# Patient Record
Sex: FEMALE | Race: BLACK OR AFRICAN AMERICAN | Employment: PART TIME | URBAN - METROPOLITAN AREA
[De-identification: names, ages, dates, MRNs, and addresses within clinical notes are randomized per-mention and may not be internally consistent; named-entity substitution may affect disease eponyms.]

---

## 2024-01-16 ENCOUNTER — HOSPITAL ENCOUNTER (EMERGENCY)
Facility: CLINIC | Age: 29
Discharge: HOME | End: 2024-01-16
Attending: EMERGENCY MEDICINE
Payer: COMMERCIAL

## 2024-01-16 ENCOUNTER — APPOINTMENT (OUTPATIENT)
Dept: GENERAL RADIOLOGY | Facility: CLINIC | Age: 29
End: 2024-01-16
Payer: COMMERCIAL

## 2024-01-16 VITALS
WEIGHT: 210 LBS | HEART RATE: 84 BPM | SYSTOLIC BLOOD PRESSURE: 132 MMHG | HEIGHT: 64 IN | DIASTOLIC BLOOD PRESSURE: 82 MMHG | TEMPERATURE: 98.1 F | OXYGEN SATURATION: 98 % | RESPIRATION RATE: 16 BRPM | BODY MASS INDEX: 35.85 KG/M2

## 2024-01-16 DIAGNOSIS — S83.91XA SPRAIN OF RIGHT KNEE, INITIAL ENCOUNTER: Primary | ICD-10-CM

## 2024-01-16 PROCEDURE — 99283 EMERGENCY DEPT VISIT LOW MDM: CPT

## 2024-01-16 PROCEDURE — 73562 X-RAY EXAM OF KNEE 3: CPT

## 2024-01-16 RX ORDER — PREDNISONE 20 MG/1
20 TABLET ORAL 2 TIMES DAILY
Qty: 10 TABLET | Refills: 0 | Status: SHIPPED | OUTPATIENT
Start: 2024-01-16 | End: 2024-01-21

## 2024-01-16 ASSESSMENT — PAIN - FUNCTIONAL ASSESSMENT: PAIN_FUNCTIONAL_ASSESSMENT: 0-10

## 2024-01-16 ASSESSMENT — PAIN SCALES - GENERAL: PAINLEVEL_OUTOF10: 8

## 2024-01-16 ASSESSMENT — LIFESTYLE VARIABLES: HOW OFTEN DO YOU HAVE A DRINK CONTAINING ALCOHOL: NEVER

## 2024-01-17 NOTE — ED PROVIDER NOTES
[facility]  EMERGENCY DEPARTMENT ENCOUNTER      Pt Name: Bethany Centeno  MRN: [id number]  Birthdate 1995  Date of evaluation: 1/16/2024  Provider: Dea Valles MD    CHIEF COMPLAINT       Chief Complaint   Patient presents with    Knee Pain     HISTORY OF PRESENT ILLNESS  (Location/Symptom, Timing/Onset, Context/Setting, Quality, Duration, Modifying Factors, Severity.)   Bethany Centeno is a 28 y.o. female who presents to the emergency department complaining of right-sided knee pain.  She relates when it is bent it is by far the worst.  She feels like it has been popping for a little while but now pain has increased and it feels worse.  She relates when she is standing the pain is actually improved.  Touching the area does not cause any pain.  It is more when she bends it or tries to use it.    Nursing Notes were reviewed.    REVIEW OF SYSTEMS    (2-9 systems for level 4, 10 or more for level 5)     Review of Systems   Musculoskeletal:         R knee popping and pain   All other systems reviewed and are negative.      Except as noted above the remainder of the review of systems was reviewed and negative.       PAST MEDICAL HISTORY   No past medical history on file.    SURGICAL HISTORY     No past surgical history on file.    CURRENT MEDICATIONS       Previous Medications    No medications on file       ALLERGIES     Patient has no allergy information on record.    FAMILY HISTORY     No family history on file.  No family status information on file.        SOCIAL HISTORY          PHYSICAL EXAM    (up to 7 for level 4, 8 or more for level 5)     Physical Exam  Vitals and nursing note reviewed.   Constitutional:       General: She is not in acute distress.     Appearance: She is well-developed. She is not ill-appearing, toxic-appearing or diaphoretic.   HENT:      Head: Normocephalic and atraumatic.      Right Ear: External ear normal.      Left Ear: External ear normal.      Nose:

## 2024-06-05 ENCOUNTER — HOSPITAL ENCOUNTER (EMERGENCY)
Facility: CLINIC | Age: 29
Discharge: HOME | End: 2024-06-05
Attending: EMERGENCY MEDICINE
Payer: COMMERCIAL

## 2024-06-05 VITALS
HEIGHT: 64 IN | TEMPERATURE: 99.1 F | DIASTOLIC BLOOD PRESSURE: 74 MMHG | OXYGEN SATURATION: 99 % | BODY MASS INDEX: 34.15 KG/M2 | SYSTOLIC BLOOD PRESSURE: 119 MMHG | HEART RATE: 84 BPM | WEIGHT: 200 LBS | RESPIRATION RATE: 18 BRPM

## 2024-06-05 DIAGNOSIS — M72.2 PLANTAR FASCIITIS, BILATERAL: Primary | ICD-10-CM

## 2024-06-05 PROCEDURE — 99283 EMERGENCY DEPT VISIT LOW MDM: CPT

## 2024-06-05 PROCEDURE — 6370000000 HC RX 637 (ALT 250 FOR IP): Performed by: NURSE PRACTITIONER

## 2024-06-05 RX ORDER — PREDNISONE 20 MG/1
TABLET ORAL
Qty: 15 TABLET | Refills: 0 | Status: SHIPPED | OUTPATIENT
Start: 2024-06-06 | End: 2024-06-16

## 2024-06-05 RX ORDER — PREDNISONE 20 MG/1
40 TABLET ORAL ONCE
Status: COMPLETED | OUTPATIENT
Start: 2024-06-05 | End: 2024-06-05

## 2024-06-05 RX ADMIN — PREDNISONE 40 MG: 20 TABLET ORAL at 18:11

## 2024-06-05 ASSESSMENT — ENCOUNTER SYMPTOMS
BACK PAIN: 0
NAUSEA: 0
VOMITING: 0
SORE THROAT: 0
SHORTNESS OF BREATH: 0
DIARRHEA: 0
COUGH: 0
ABDOMINAL PAIN: 0

## 2024-06-05 ASSESSMENT — PAIN DESCRIPTION - ORIENTATION: ORIENTATION: RIGHT;LEFT

## 2024-06-05 ASSESSMENT — PAIN DESCRIPTION - LOCATION: LOCATION: FOOT

## 2024-06-05 ASSESSMENT — PAIN SCALES - GENERAL: PAINLEVEL_OUTOF10: 9

## 2024-06-05 ASSESSMENT — PAIN - FUNCTIONAL ASSESSMENT: PAIN_FUNCTIONAL_ASSESSMENT: 0-10

## 2024-06-05 NOTE — ED PROVIDER NOTES
[facility]  eMERGENCY dEPARTMENT eNCOUnter   Independent Attestation     Pt Name: Bethany Centeno  MRN: [id number]  Birthdate 1995  Date of evaluation: 6/5/24       Bethany Centeno is a 28 y.o. female who presents with Foot Pain (Pt c/o bilat foot pain past 3 days. 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 Gait steady with ambulation to room )        Based on the medical record, the care appears appropriate. I was personally available for consultation in the Emergency Department.    Rell Rutherford DO  Attending Emergency  Physician                Rell Rutherford DO  06/05/24 [id number]

## 2024-06-05 NOTE — ED PROVIDER NOTES
[facility]  EMERGENCY DEPARTMENT ENCOUNTER      Pt Name: Bethany Centeno  MRN: [id number]  Birthdate 1995  Date of evaluation: 6/5/2024  Provider: KRYSTLE Rose CNP  6:16 PM    CHIEF COMPLAINT       Chief Complaint   Patient presents with    Foot Pain     Pt c/o bilat foot pain past 3 days. 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 Gait steady with ambulation to room          HISTORY OF PRESENT ILLNESS    Bethany Centeno is a 28 y.o. 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 previously for this, had steroids that helped with the symptoms she reports that she does have inserts for her shoes however they are older, she was concerned due to the continued pain prompting her to come to the emergency department for evaluation.  She has had no recent fevers illnesses no trauma or injury to her feet.  Patient is ambulatory without difficulty on arrival into the exam room wearing crocs without podiatry inserts.    The history is provided by the patient.       Nursing Notes were reviewed.    REVIEW OF SYSTEMS       Review of Systems   Constitutional:  Negative for chills, fatigue and fever.   HENT:  Negative for congestion and sore throat.    Respiratory:  Negative for cough and shortness of breath.    Cardiovascular:  Negative for chest pain and leg swelling.   Gastrointestinal:  Negative for abdominal pain, diarrhea, nausea and

## 2024-06-05 NOTE — DISCHARGE INSTRUCTIONS
Complete prednisone as prescribed.    Tylenol over-the-counter as directed to help with pain.    Return to the ER: Fevers, redness warmth swelling to the feet, worsening pain or inability to walk, numbness, or any other concerning symptoms.